# Patient Record
Sex: MALE | Race: WHITE | NOT HISPANIC OR LATINO | Employment: STUDENT | URBAN - NONMETROPOLITAN AREA
[De-identification: names, ages, dates, MRNs, and addresses within clinical notes are randomized per-mention and may not be internally consistent; named-entity substitution may affect disease eponyms.]

---
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
## 2025-02-02 NOTE — PROGRESS NOTES
CHEPEWILLIAM Epperson is a 18 y.o. male.   Chief Complaint   Patient presents with   • Vomiting      Vomiting    This is a new problem. The current episode started today. Episode frequency: once today and 3 times 2 days ago. The problem has been gradually improving. The emesis has an appearance of stomach contents. There has been no fever. Pertinent negatives include no abdominal pain, arthralgias, chills, coughing, diarrhea, dizziness, fever, headaches or myalgias. Treatments tried: liquids. The treatment provided mild relief.      Vicente Epperson presents to [facility] accompanied by parent/guardian with cc of vomiting once at school today, says he was taking a test and just vomited.  Vicente says he had vomited 2 days ago about 3 times, he denies fever, recent illness and says he ate a Pop Tart for breakfast.   Vicente says he has drank liquids since he vomited and kept that down.  Reviewed PMFSH, immunizations are UTD.  See ROS.    The following portions of the patient's history were reviewed and updated as appropriate: allergies, current medications, past family history, past medical history, past social history, past surgical history and problem list.    Current Outpatient Medications:   •  ARIPiprazole (ABILIFY) 5 MG tablet, Take 1 tablet by mouth Daily., Disp: 30 tablet, Rfl: 0  •  loratadine (CLARITIN) 10 MG tablet, Take 10 mg by mouth Daily., Disp: , Rfl: 1  •  brompheniramine-pseudoephedrine-DM 30-2-10 MG/5ML syrup, Take 10 mL by mouth 4 (Four) Times a Day As Needed for Congestion, Cough or Allergies., Disp: 118 mL, Rfl: 0  •  minocycline (MINOCIN,DYNACIN) 50 MG capsule, Take 50 mg by mouth 2 (Two) Times a Day., Disp: , Rfl:   •  ondansetron ODT (ZOFRAN ODT) 4 MG disintegrating tablet, Take 1 tablet by mouth Every 12 (Twelve) Hours As Needed for Nausea or Vomiting for up to 2 days., Disp: 4 tablet, Rfl: 0    No Known Allergies    Review of Systems   Constitutional: Negative for chills and fever.    HENT: Negative for congestion and sore throat.    Respiratory: Negative for cough.    Gastrointestinal: Positive for nausea and vomiting (vomited 2 days ago and once at school today, has drank fluids since then and tolerated well). Negative for abdominal pain and diarrhea.   Endocrine: Negative for cold intolerance.   Genitourinary: Negative for dysuria, frequency and urgency.   Musculoskeletal: Negative for arthralgias and myalgias.   Skin: Negative for rash.   Neurological: Negative for dizziness and headaches.       Objective     Visit Vitals  Pulse (!) 93   Temp 97.4 °F (36.3 °C) (Temporal)   Resp 20   Wt 63 kg (138 lb 12.8 oz)   SpO2 99%         Physical Exam   Constitutional: He is oriented to person, place, and time. He appears well-developed and well-nourished. No distress.   HENT:   Head: Normocephalic and atraumatic.   Right Ear: Tympanic membrane, external ear and ear canal normal.   Left Ear: Tympanic membrane, external ear and ear canal normal.   Nose: Mucosal edema present. Right sinus exhibits no maxillary sinus tenderness and no frontal sinus tenderness. Left sinus exhibits no maxillary sinus tenderness and no frontal sinus tenderness.   Mouth/Throat: Uvula is midline, oropharynx is clear and moist and mucous membranes are normal. No tonsillar exudate.   Eyes: Conjunctivae and EOM are normal. Pupils are equal, round, and reactive to light.   Neck: Normal range of motion. Neck supple.   Cardiovascular: Normal rate, regular rhythm and normal heart sounds.   Pulmonary/Chest: Effort normal and breath sounds normal. No respiratory distress.   Abdominal: Soft. Normal appearance. He exhibits no distension and no mass. Bowel sounds are increased. There is no hepatosplenomegaly. There is tenderness. There is no rebound, no guarding and no CVA tenderness.   Musculoskeletal: Normal range of motion. He exhibits no tenderness.   Lymphadenopathy:     He has no cervical adenopathy.   Neurological: He is alert and  oriented to person, place, and time.   Skin: Skin is warm and dry. No rash noted.   Psychiatric: He has a normal mood and affect. His behavior is normal. Judgment and thought content normal.   Nursing note and vitals reviewed.      Lab Results (last 24 hours)     ** No results found for the last 24 hours. **          Assessment/Plan   Vicente was seen today for vomiting.    Diagnoses and all orders for this visit:    Nausea and vomiting in pediatric patient  -     ondansetron ODT (ZOFRAN ODT) 4 MG disintegrating tablet; Take 1 tablet by mouth Every 12 (Twelve) Hours As Needed for Nausea or Vomiting for up to 2 days.              DISPLAY PLAN FREE TEXT